# Patient Record
Sex: MALE
[De-identification: names, ages, dates, MRNs, and addresses within clinical notes are randomized per-mention and may not be internally consistent; named-entity substitution may affect disease eponyms.]

---

## 2024-08-25 PROBLEM — Z00.00 ENCOUNTER FOR PREVENTIVE HEALTH EXAMINATION: Status: ACTIVE | Noted: 2024-08-25

## 2024-08-29 ENCOUNTER — APPOINTMENT (OUTPATIENT)
Dept: OTOLARYNGOLOGY | Facility: CLINIC | Age: 74
End: 2024-08-29

## 2024-08-29 DIAGNOSIS — H65.22 CHRONIC SEROUS OTITIS MEDIA, LEFT EAR: ICD-10-CM

## 2024-08-29 PROCEDURE — 31231 NASAL ENDOSCOPY DX: CPT

## 2024-08-29 PROCEDURE — 99204 OFFICE O/P NEW MOD 45 MIN: CPT | Mod: 25

## 2024-09-03 NOTE — HISTORY OF PRESENT ILLNESS
[de-identified] : CC: nasopharyngeal lesion   HISTORY OF PRESENT ILLNESS: Dimitris Swann is a 75 y/o male who presents today with nasopharyngeal lesion previously seen by Dr. Gross, an Otolaryngologist, due to the complexity of the case presented to Dr. Gross for decreased hearing, found to have a large NP lesion as well as otitis media now s/p AS myringotomy with tube placement the lesion in the nasopharynx is substantial, and on MRI neck it shows a cystic lesion with possible communication to the sella with questionable CSF content patient has some baseline mild sinonasal symptoms significant cardiac history s/p bypass in 2012 on baby aspirin   REVIEW OF SYSTEMS: General ROS: negative for - chills, fatigue or fever Psychological ROS: negative for - anxiety or depression Ophthalmic ROS: negative for - blurry vision, decreased vision or double vision ENT ROS: negative except as noted from HPI Allergy and Immunology ROS: negative except as noted from HPI Hematological and Lymphatic ROS: negative for - bleeding problems Endocrine ROS: negative for - malaise/lethargy Respiratory ROS: negative for - stridor Cardiovascular ROS: negative for - chest pain Gastrointestinal ROS: negative for - appetite loss or nausea/vomiting Genitourinary ROS: negative for - incontinence Musculoskeletal ROS: negative for - gait disturbance Neurological ROS: negative for - behavioral changes Dermatological ROS: negative for - nail changes   Physical Exam:   GENERAL APPEARANCE: Well-developed and No Acute Distress. COMMUNICATION: Able to Communicate. Strong Voice.   HEAD AND FACE Eyes: Testing of ocular motility including primary gaze alignment normal. Inspection and Appearance: No evidence of lesions or masses Palpation: Palpation of the face reveals no sinus tenderness Salivary Glands: Symmetric without masses Facial Strength: Symmetric without evidence of facial paralysis   EAR, NOSE, MOUTH, and THROAT: Ear Canals and Tympanic Membranes, Bilateral: No evidence of inflammation or lesions. Thresholds: Clinical speech reception thresholds normal. External, Nose and Auricle: No lesions or masses. Nasal, Mucosa, Septum, and Turbinates: See endoscopy.   NECK: Evaluation: No evidence of masses or crepitus. The neck is symmetric and the trachea is in the midline position. Thyroid: No evidence of enlargement, tenderness or mass. Neck Lymph Nodes: WNL. Respiratory: Inspection of the chest including symmetry, expansion and/or assessment of respiratory effort normal. Cardiovascular: Evaluation of peripheral vascular system by observation and palpation of capillary refill, normal. Neurological/Psychiatric: Alert, Oriented, Mood, and Affect Normal.   PROCEDURE: Nasal endoscopy (32354)   SURGEON: Aaron Alves MD   Prior to the procedure, I had a discussion with the patient regarding the risks, benefits, and alternatives of the procedure and a verbal consent was obtained.   After obtaining adequate decongestion of the nasal mucosa with topical Epinephrine and adequate anesthesia with topical Lidocaine nasal spray, the nasal endoscope was used to examine the nasal passages and paranasal sinuses. The endoscope was passed along the floor of the nose bilaterally to evaluate the inferior meatus, floor of the nose, inferior turbinate, and nasopharynx. The scope was then passed superiorly to evaluate the area of the sphenoethmoidal recess, superior turbinate and superior meatus. As the scope was withdrawn anteriorly, the middle turbinate and middle meatus were carefully inspected. The endoscope was withdrawn and the patient tolerated the procedure well. No complications were encountered.   INSTRUMENTS: rigid 30 peds   EXAM FINDINGS: large nasopharyngeal lesion noted, incompletely obstructive the nasopharynx, smooth mucosa, no ulceration or exophytic lesion  IMPRESSION: Mr. 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 Gross, and Dr. D'Amico regarding imaging findings and possible treatment options -will obtain MRI sella w/wo -TTM after MRI, likely biopsy and marsupiliazation of cyst   Aaron Alvse MD [facility] Rhinology and Skull Base Surgery Department of Otolaryngology- Head and Neck Surgery [facility]

## 2024-09-03 NOTE — HISTORY OF PRESENT ILLNESS
[de-identified] : CC: nasopharyngeal lesion   HISTORY OF PRESENT ILLNESS: Dimitris Swann is a 73 y/o male who presents today with nasopharyngeal lesion previously seen by Dr. Gross, an Otolaryngologist, due to the complexity of the case presented to Dr. Gross for decreased hearing, found to have a large NP lesion as well as otitis media now s/p AS myringotomy with tube placement the lesion in the nasopharynx is substantial, and on MRI neck it shows a cystic lesion with possible communication to the sella with questionable CSF content patient has some baseline mild sinonasal symptoms significant cardiac history s/p bypass in 2012 on baby aspirin   REVIEW OF SYSTEMS: General ROS: negative for - chills, fatigue or fever Psychological ROS: negative for - anxiety or depression Ophthalmic ROS: negative for - blurry vision, decreased vision or double vision ENT ROS: negative except as noted from HPI Allergy and Immunology ROS: negative except as noted from HPI Hematological and Lymphatic ROS: negative for - bleeding problems Endocrine ROS: negative for - malaise/lethargy Respiratory ROS: negative for - stridor Cardiovascular ROS: negative for - chest pain Gastrointestinal ROS: negative for - appetite loss or nausea/vomiting Genitourinary ROS: negative for - incontinence Musculoskeletal ROS: negative for - gait disturbance Neurological ROS: negative for - behavioral changes Dermatological ROS: negative for - nail changes   Physical Exam:   GENERAL APPEARANCE: Well-developed and No Acute Distress. COMMUNICATION: Able to Communicate. Strong Voice.   HEAD AND FACE Eyes: Testing of ocular motility including primary gaze alignment normal. Inspection and Appearance: No evidence of lesions or masses Palpation: Palpation of the face reveals no sinus tenderness Salivary Glands: Symmetric without masses Facial Strength: Symmetric without evidence of facial paralysis   EAR, NOSE, MOUTH, and THROAT: Ear Canals and Tympanic Membranes, Bilateral: No evidence of inflammation or lesions. Thresholds: Clinical speech reception thresholds normal. External, Nose and Auricle: No lesions or masses. Nasal, Mucosa, Septum, and Turbinates: See endoscopy.   NECK: Evaluation: No evidence of masses or crepitus. The neck is symmetric and the trachea is in the midline position. Thyroid: No evidence of enlargement, tenderness or mass. Neck Lymph Nodes: WNL. Respiratory: Inspection of the chest including symmetry, expansion and/or assessment of respiratory effort normal. Cardiovascular: Evaluation of peripheral vascular system by observation and palpation of capillary refill, normal. Neurological/Psychiatric: Alert, Oriented, Mood, and Affect Normal.   PROCEDURE: Nasal endoscopy (13983)   SURGEON: Aaron Alves MD   Prior to the procedure, I had a discussion with the patient regarding the risks, benefits, and alternatives of the procedure and a verbal consent was obtained.   After obtaining adequate decongestion of the nasal mucosa with topical Epinephrine and adequate anesthesia with topical Lidocaine nasal spray, the nasal endoscope was used to examine the nasal passages and paranasal sinuses. The endoscope was passed along the floor of the nose bilaterally to evaluate the inferior meatus, floor of the nose, inferior turbinate, and nasopharynx. The scope was then passed superiorly to evaluate the area of the sphenoethmoidal recess, superior turbinate and superior meatus. As the scope was withdrawn anteriorly, the middle turbinate and middle meatus were carefully inspected. The endoscope was withdrawn and the patient tolerated the procedure well. No complications were encountered.   INSTRUMENTS: rigid 30 peds   EXAM FINDINGS: large nasopharyngeal lesion noted, incompletely obstructive the nasopharynx, smooth mucosa, no ulceration or exophytic lesion  IMPRESSION: Mr. 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 Gross, and Dr. D'Amico regarding imaging findings and possible treatment options -will obtain MRI sella w/wo -TTM after MRI, likely biopsy and marsupiliazation of cyst   Aaron Alves MD [facility] Rhinology and Skull Base Surgery Department of Otolaryngology- Head and Neck Surgery [facility]

## 2024-09-05 PROBLEM — J39.2 LESION OF NASOPHARYNX: Status: ACTIVE | Noted: 2024-08-29

## 2024-09-05 NOTE — HISTORY OF PRESENT ILLNESS
[de-identified] : 75 y/o male with a PMHx of CAD s/p bypass in 2012 (on asa 81mg) who presents as referral from ENT Dr. Alves for evaluation of nasopharyngeal lesion.   - Pt originally presented to Dr. Gross for decreased hearing and was found to have titis media (now s/p AS myringotomy with tube placement) and nasopharyngeal lesion for which was referred to Dr. Alves.  - 7/22/24 MRI of neck soft tissues done at [facility] with report of bilobed cystic lesion in the nasopharynx without omayra soft tissue component or enhancement; nonspecific minimally prominent left retropharyngeal lymph nodes; focal area of signal abnormality in the posterior aspect of the clivus noted to be remote from the nasepharyngeal finding.  - 8/29/24 pt was evaluated by Dr. Alves who ordered MRI SELLA and referred to neurosurgery.   Presents today for evaluation after completed the MRI Sella 9/10/24.   ENT: Aaron Alves

## 2024-09-10 ENCOUNTER — OUTPATIENT (OUTPATIENT)
Dept: OUTPATIENT SERVICES | Facility: HOSPITAL | Age: 74
LOS: 1 days | End: 2024-09-10

## 2024-09-10 ENCOUNTER — APPOINTMENT (OUTPATIENT)
Dept: MRI IMAGING | Facility: CLINIC | Age: 74
End: 2024-09-10
Payer: MEDICARE

## 2024-09-10 PROCEDURE — 70553 MRI BRAIN STEM W/O & W/DYE: CPT | Mod: 26

## 2024-09-12 ENCOUNTER — APPOINTMENT (OUTPATIENT)
Dept: OTOLARYNGOLOGY | Facility: CLINIC | Age: 74
End: 2024-09-12
Payer: MEDICARE

## 2024-09-12 DIAGNOSIS — H65.22 CHRONIC SEROUS OTITIS MEDIA, LEFT EAR: ICD-10-CM

## 2024-09-12 DIAGNOSIS — J39.2 OTHER DISEASES OF PHARYNX: ICD-10-CM

## 2024-09-12 PROCEDURE — 99443: CPT | Mod: 93

## 2024-09-14 NOTE — HISTORY OF PRESENT ILLNESS
[de-identified] : CC: nasopharyngeal lesion   HISTORY OF PRESENT ILLNESS: Dimitris Swann is a 73 y/o male who presents today with nasopharyngeal lesion previously seen by Dr. Gross, an Otolaryngologist, due to the complexity of the case presented to Dr. Gross for decreased hearing, found to have a large NP lesion as well as otitis media now s/p AS myringotomy with tube placement the lesion in the nasopharynx is substantial, and on MRI neck it shows a cystic lesion with possible communication to the sella with questionable CSF content patient has some baseline mild sinonasal symptoms significant cardiac history s/p bypass in 2012 on baby aspirin    s/p MRI sella protocol reviewed by me and also by Dr. 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  REVIEW OF SYSTEMS: General ROS: negative for - chills, fatigue or fever Psychological ROS: negative for - anxiety or depression Ophthalmic ROS: negative for - blurry vision, decreased vision or double vision ENT ROS: negative except as noted from HPI Allergy and Immunology ROS: negative except as noted from HPI Hematological and Lymphatic ROS: negative for - bleeding problems Endocrine ROS: negative for - malaise/lethargy Respiratory ROS: negative for - stridor Cardiovascular ROS: negative for - chest pain Gastrointestinal ROS: negative for - appetite loss or nausea/vomiting Genitourinary ROS: negative for - incontinence Musculoskeletal ROS: negative for - gait disturbance Neurological ROS: negative for - behavioral changes Dermatological ROS: negative for - nail changes  IMPRESSION: Mr. 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 Gross -due to its size and obstructive nature, especially to the left eustachian tube, he's suffering from chronic otitis media  -r/b/a discussed, we will plan for OR drainage and marsupialization -OR date pending   Aaron Alves MD [facility] Rhinology and Skull Base Surgery Department of Otolaryngology- Head and Neck Surgery [facility]

## 2024-09-18 ENCOUNTER — NON-APPOINTMENT (OUTPATIENT)
Age: 74
End: 2024-09-18

## 2024-09-18 ENCOUNTER — APPOINTMENT (OUTPATIENT)
Dept: NEUROSURGERY | Facility: CLINIC | Age: 74
End: 2024-09-18
Payer: MEDICARE

## 2024-09-18 VITALS
HEIGHT: 67 IN | HEART RATE: 86 BPM | OXYGEN SATURATION: 98 % | SYSTOLIC BLOOD PRESSURE: 109 MMHG | WEIGHT: 170 LBS | DIASTOLIC BLOOD PRESSURE: 65 MMHG | RESPIRATION RATE: 18 BRPM | BODY MASS INDEX: 26.68 KG/M2

## 2024-09-18 PROCEDURE — 99204 OFFICE O/P NEW MOD 45 MIN: CPT

## 2024-09-18 NOTE — HISTORY OF PRESENT ILLNESS
[de-identified] : 73 y/o male with a PMHx of CAD s/p bypass in 2012 (on asa 81mg),  who presents as referral from ENT Dr. Alves for evaluation of nasopharyngeal lesion.   - Pt originally presented to Dr. Gross for decreased hearing and was found to have titis media (now s/p AS myringotomy with tube placement) and nasopharyngeal lesion for which was referred to Dr. Alves.  - 7/22/24 MRI of neck soft tissues done at [facility] with report of bilobed cystic lesion in the nasopharynx without omayra soft tissue component or enhancement; nonspecific minimally prominent left retropharyngeal lymph nodes; focal area of signal abnormality in the posterior aspect of the clivus noted to be remote from the nasepharyngeal finding.  - 8/29/24 pt was evaluated by Dr. Alves who ordered MRI SELLA and referred to neurosurgery.   Presents today for evaluation after completed the MRI Sella 9/10/24.   ENT: Aaron Alves

## 2024-09-18 NOTE — ASSESSMENT
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 The lesion is in close proximity to the skull base, prompting consultation for possible surgical intervention. 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 Discussed the low likelihood of malignancy, but cannot be completely excluded. Informed the patient that surgical intervention may not be necessary unless absolutely indicated. Patient demonstrated good understanding  Dr. D'Amico independently reviewed all available images with patient.  PLAN: -surgery scheduled 10/4/24 coordinated with Dr. Alves   Patient verbalizes understanding of today's discussion and next steps in treatment plan.  Today, my ACP, Stephanie Charles, was here to observe my evaluation and management services for this patient to be followed going forward.   A total of 45 minutes was spent reviewing the labs, imaging and physical examination of the patient. We discussed the diagnosis, and the plan. The patient's questions were answered. The patient demonstrated an excellent understanding of the plan.

## 2024-09-18 NOTE — HISTORY OF PRESENT ILLNESS
[de-identified] : 73 y/o male with a PMHx of CAD s/p bypass in 2012 (on asa 81mg),  who presents as referral from ENT Dr. Alves for evaluation of nasopharyngeal lesion.   - Pt originally presented to Dr. Gross for decreased hearing and was found to have titis media (now s/p AS myringotomy with tube placement) and nasopharyngeal lesion for which was referred to Dr. Alves.  - 7/22/24 MRI of neck soft tissues done at [facility] with report of bilobed cystic lesion in the nasopharynx without omayra soft tissue component or enhancement; nonspecific minimally prominent left retropharyngeal lymph nodes; focal area of signal abnormality in the posterior aspect of the clivus noted to be remote from the nasepharyngeal finding.  - 8/29/24 pt was evaluated by Dr. Alves who ordered MRI SELLA and referred to neurosurgery.   Presents today for evaluation after completed the MRI Sella 9/10/24.   ENT: Aaron Alves

## 2024-09-18 NOTE — HISTORY OF PRESENT ILLNESS
[de-identified] : 73 y/o male with a PMHx of CAD s/p bypass in 2012 (on asa 81mg),  who presents as referral from ENT Dr. Alves for evaluation of nasopharyngeal lesion.   - Pt originally presented to Dr. Gross for decreased hearing and was found to have titis media (now s/p AS myringotomy with tube placement) and nasopharyngeal lesion for which was referred to Dr. Alves.  - 7/22/24 MRI of neck soft tissues done at [facility] with report of bilobed cystic lesion in the nasopharynx without omayra soft tissue component or enhancement; nonspecific minimally prominent left retropharyngeal lymph nodes; focal area of signal abnormality in the posterior aspect of the clivus noted to be remote from the nasepharyngeal finding.  - 8/29/24 pt was evaluated by Dr. Alves who ordered MRI SELLA and referred to neurosurgery.   Presents today for evaluation after completed the MRI Sella 9/10/24.   ENT: Aaron Alves

## 2024-10-03 ENCOUNTER — TRANSCRIPTION ENCOUNTER (OUTPATIENT)
Age: 74
End: 2024-10-03

## 2024-10-03 VITALS
HEART RATE: 68 BPM | RESPIRATION RATE: 16 BRPM | DIASTOLIC BLOOD PRESSURE: 78 MMHG | OXYGEN SATURATION: 97 % | TEMPERATURE: 97 F | SYSTOLIC BLOOD PRESSURE: 134 MMHG | HEIGHT: 67 IN | WEIGHT: 165.35 LBS

## 2024-10-03 RX ORDER — CILOSTAZOL 50 MG/1
1 TABLET ORAL
Refills: 0 | DISCHARGE

## 2024-10-03 RX ORDER — ASPIRIN 325 MG
0 TABLET ORAL
Refills: 0 | DISCHARGE

## 2024-10-03 RX ORDER — ASPIRIN 325 MG
1 TABLET ORAL
Refills: 0 | DISCHARGE

## 2024-10-03 NOTE — ASU PATIENT PROFILE, ADULT - NS PRO AD PATIENT TYPE
sister, Milena Burgess,/Health Care Proxy (HCP) sister, Milena Mena-----[phone]/Health Care Proxy (HCP)

## 2024-10-03 NOTE — ASU PATIENT PROFILE, ADULT - NSICDXPASTMEDICALHX_GEN_ALL_CORE_FT
PAST MEDICAL HISTORY:  CAD (coronary artery disease)     Cataract     Chronic kidney disease (CKD)     Glaucoma     Heart attack 2012    HTN (hypertension)     Hyperparathyroidism     PAD (peripheral artery disease)     RBBB      PAST MEDICAL HISTORY:  CAD (coronary artery disease)     Cataract     Chronic kidney disease (CKD)     Glaucoma     Heart attack 2012    HLD (hyperlipidemia)     HTN (hypertension)     Hyperparathyroidism     PAD (peripheral artery disease)     RBBB

## 2024-10-04 ENCOUNTER — RESULT REVIEW (OUTPATIENT)
Age: 74
End: 2024-10-04

## 2024-10-04 ENCOUNTER — TRANSCRIPTION ENCOUNTER (OUTPATIENT)
Age: 74
End: 2024-10-04

## 2024-10-04 ENCOUNTER — APPOINTMENT (OUTPATIENT)
Dept: OTOLARYNGOLOGY | Facility: HOSPITAL | Age: 74
End: 2024-10-04

## 2024-10-04 ENCOUNTER — OUTPATIENT (OUTPATIENT)
Dept: INPATIENT UNIT | Facility: HOSPITAL | Age: 74
LOS: 1 days | Discharge: HOME | End: 2024-10-04
Payer: MEDICARE

## 2024-10-04 VITALS
HEART RATE: 97 BPM | RESPIRATION RATE: 18 BRPM | OXYGEN SATURATION: 96 % | SYSTOLIC BLOOD PRESSURE: 139 MMHG | DIASTOLIC BLOOD PRESSURE: 77 MMHG

## 2024-10-04 DIAGNOSIS — Z95.1 PRESENCE OF AORTOCORONARY BYPASS GRAFT: Chronic | ICD-10-CM

## 2024-10-04 DIAGNOSIS — Z41.9 ENCOUNTER FOR PROCEDURE FOR PURPOSES OTHER THAN REMEDYING HEALTH STATE, UNSPECIFIED: Chronic | ICD-10-CM

## 2024-10-04 PROCEDURE — 30118 REMOVAL OF INTRANASAL LESION: CPT

## 2024-10-04 PROCEDURE — 88364 INSITU HYBRIDIZATION (FISH): CPT

## 2024-10-04 PROCEDURE — 88342 IMHCHEM/IMCYTCHM 1ST ANTB: CPT

## 2024-10-04 PROCEDURE — 88364 INSITU HYBRIDIZATION (FISH): CPT | Mod: 26

## 2024-10-04 PROCEDURE — 88365 INSITU HYBRIDIZATION (FISH): CPT | Mod: 26

## 2024-10-04 PROCEDURE — 88305 TISSUE EXAM BY PATHOLOGIST: CPT | Mod: 26

## 2024-10-04 PROCEDURE — C9399: CPT

## 2024-10-04 PROCEDURE — 88341 IMHCHEM/IMCYTCHM EA ADD ANTB: CPT | Mod: 26

## 2024-10-04 PROCEDURE — 30117 REMOVAL OF INTRANASAL LESION: CPT

## 2024-10-04 PROCEDURE — 88341 IMHCHEM/IMCYTCHM EA ADD ANTB: CPT

## 2024-10-04 PROCEDURE — 88305 TISSUE EXAM BY PATHOLOGIST: CPT

## 2024-10-04 PROCEDURE — 88365 INSITU HYBRIDIZATION (FISH): CPT

## 2024-10-04 PROCEDURE — 88342 IMHCHEM/IMCYTCHM 1ST ANTB: CPT | Mod: 26

## 2024-10-04 RX ORDER — GABAPENTIN 800 MG/1
0 TABLET, FILM COATED ORAL
Refills: 0 | DISCHARGE

## 2024-10-04 RX ORDER — SODIUM CHLORIDE IRRIG SOLUTION 0.9 %
1000 SOLUTION, IRRIGATION IRRIGATION
Refills: 0 | Status: DISCONTINUED | OUTPATIENT
Start: 2024-10-04 | End: 2024-10-04

## 2024-10-04 RX ORDER — CARVEDILOL 3.125 MG
1 TABLET ORAL
Refills: 0 | DISCHARGE

## 2024-10-04 RX ORDER — HYDROMORPHONE HYDROCHLORIDE 1 MG/ML
0.5 INJECTION, SOLUTION INTRAMUSCULAR; INTRAVENOUS; SUBCUTANEOUS
Refills: 0 | Status: DISCONTINUED | OUTPATIENT
Start: 2024-10-04 | End: 2024-10-04

## 2024-10-04 RX ORDER — ACETAMINOPHEN 325 MG
650 TABLET ORAL EVERY 6 HOURS
Refills: 0 | Status: DISCONTINUED | OUTPATIENT
Start: 2024-10-04 | End: 2024-10-04

## 2024-10-04 RX ORDER — HYDROCHLOROTHIAZIDE 50 MG/1
1 TABLET ORAL
Refills: 0 | DISCHARGE

## 2024-10-04 RX ORDER — ONDANSETRON HCL/PF 4 MG/2 ML
4 VIAL (ML) INJECTION EVERY 6 HOURS
Refills: 0 | Status: DISCONTINUED | OUTPATIENT
Start: 2024-10-04 | End: 2024-10-04

## 2024-10-04 RX ORDER — LOSARTAN POTASSIUM 100 MG/1
1 TABLET, FILM COATED ORAL
Refills: 0 | DISCHARGE

## 2024-10-04 RX ORDER — ATORVASTATIN CALCIUM 10 MG/1
1 TABLET, FILM COATED ORAL
Refills: 0 | DISCHARGE

## 2024-10-04 NOTE — ASU DISCHARGE PLAN (ADULT/PEDIATRIC) - CARE PROVIDER_API CALL
Poly Alves  Otolaryngology  [address] [place] [address], [address]  [place] [address]  Phone: [phone]  Fax: [phone]  Follow Up Time:

## 2024-10-04 NOTE — ASU DISCHARGE PLAN (ADULT/PEDIATRIC) - ASU DC SPECIAL INSTRUCTIONSFT
For pain you may take Tylenol 975mg every 6 hours as needed. Do not exceed 4g/24hrs.      Call office to make f/u appt w/ Dr. Alves in 1wk.    Resume Plavix/Cilostazol tomorrow.

## 2024-10-04 NOTE — ASU DISCHARGE PLAN (ADULT/PEDIATRIC) - NS MD DC FALL RISK RISK
For information on Fall & Injury Prevention, visit: [email or web address] OR  [email or web address] OR  https://www.cdc.gov/steadi/patient.html

## 2024-10-10 ENCOUNTER — APPOINTMENT (OUTPATIENT)
Dept: OTOLARYNGOLOGY | Facility: CLINIC | Age: 74
End: 2024-10-10
Payer: MEDICARE

## 2024-10-10 DIAGNOSIS — H65.22 CHRONIC SEROUS OTITIS MEDIA, LEFT EAR: ICD-10-CM

## 2024-10-10 DIAGNOSIS — J39.2 OTHER DISEASES OF PHARYNX: ICD-10-CM

## 2024-10-10 LAB — SURGICAL PATHOLOGY STUDY: SIGNIFICANT CHANGE UP

## 2024-10-10 PROCEDURE — 99213 OFFICE O/P EST LOW 20 MIN: CPT | Mod: 25

## 2024-10-10 PROCEDURE — 31231 NASAL ENDOSCOPY DX: CPT | Mod: 78

## (undated) DEVICE — SYR LUER LOK 30CC

## (undated) DEVICE — Device

## (undated) DEVICE — BUR MEDTRONIC ENT HIGH SPEED CURVED DIAMOND 15 DEGREE 4.0MM X 15CM

## (undated) DEVICE — SUT CHROMIC 4-0 27" SH

## (undated) DEVICE — SUT MONOCRYL PLUS 4-0 18" PS-2 UNDYED

## (undated) DEVICE — VENODYNE/SCD SLEEVE CALF MEDIUM

## (undated) DEVICE — ELCTR COLORADO 3CM

## (undated) DEVICE — PACK UPPER BODY

## (undated) DEVICE — DRSG NASOPORE 4CM FIRM

## (undated) DEVICE — CLEANING SHEATH ENDO-SCRUB FOR STORZ 7210AA TELESCOPE 4MM 0 DEGREE

## (undated) DEVICE — CLEANING SHEATH ENDO-SCRUB FOR STORZ 7230CA ARTHROSCOPE 4MM 70 DEGREE

## (undated) DEVICE — ELCTR BOVIE PENCIL SMOKE EVACUATION

## (undated) DEVICE — ELCTR BOVIE SUCTION 8FR 6"

## (undated) DEVICE — NDL ELECTRODE ULTRACLEAN 6

## (undated) DEVICE — TUBING SUCTION 20FT

## (undated) DEVICE — WARMING BLANKET LOWER ADULT

## (undated) DEVICE — SPECIMEN CONTAINER 4OZ

## (undated) DEVICE — BLADE MEDTRONIC ENT QUADCUT ROTATABLE STRAIGHT 4MM X 13CM

## (undated) DEVICE — SOL ANTI FOG

## (undated) DEVICE — DRSG STERISTRIPS 0.5 X 4"

## (undated) DEVICE — GLV 7 PROTEXIS (WHITE)

## (undated) DEVICE — ELCTR GROUNDING PAD ADULT COVIDIEN

## (undated) DEVICE — BUR MEDTRONIC ENT HIGH SPEED CURVED DIAMOND 70 DEGREE 5.0MM X 13CM